# Patient Record
Sex: FEMALE | Race: BLACK OR AFRICAN AMERICAN | Employment: FULL TIME | URBAN - METROPOLITAN AREA
[De-identification: names, ages, dates, MRNs, and addresses within clinical notes are randomized per-mention and may not be internally consistent; named-entity substitution may affect disease eponyms.]

---
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
## 2020-05-05 ENCOUNTER — TRANSFERRED RECORDS (OUTPATIENT)
Dept: PHYSICAL THERAPY | Facility: CLINIC | Age: 20
End: 2020-05-05

## 2020-09-01 ENCOUNTER — THERAPY VISIT (OUTPATIENT)
Dept: PHYSICAL THERAPY | Facility: CLINIC | Age: 20
End: 2020-09-01
Payer: COMMERCIAL

## 2020-09-01 DIAGNOSIS — M54.2 NECK PAIN: ICD-10-CM

## 2020-09-01 PROCEDURE — 97161 PT EVAL LOW COMPLEX 20 MIN: CPT | Mod: GP | Performed by: PHYSICAL THERAPIST

## 2020-09-01 PROCEDURE — 97110 THERAPEUTIC EXERCISES: CPT | Mod: GP | Performed by: PHYSICAL THERAPIST

## 2020-09-01 NOTE — PROGRESS NOTES
[facility] Initial Evaluation  Subjective:  The history is provided by the patient.   C/C: Patient is a 20-year-old woman with complaints of relatively constant sore/dull neck pain.  Describes pain is being from the base of her skull to the base of her neck.  Intensity is 6-7/10.  Is aggravated by prolonged postures.  Is relieved with laying down on her back.  Pain will interrupt sleep at times.  Denies headache, dizziness, blurred vision, ringing in the ears.  No radiation of symptoms into her upper extremities.  No numbness or tingling.  Patient feels pain has stayed the same.  Hx:  12/2019-developed a cold with an associated tension headache.  Patient states that neck pain started during this time and has persisted.  Headache is relatively resolved.  Has tried over-the-counter medication with no real relief.  On occasion will try to self adjust her neck which does not improve pain.  PMH: No previous history of neck problems or injury.  General health: Pretty good (as per patient).  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                    Objective:  Standing Alignment:    Cervical/Thoracic:  Forward head                                    Cervical/Thoracic Evaluation    AROM:  AROM Cervical:    Flexion:            Chin to chest  Extension:       WNL  Rotation:         Left: WNL with use of lower cervical mechanics at endrange     Right: WNL with use of lower cervical mechanics at endrange  Side Bend:      Left: WNL-slight tendency to angle in mid c-sp     Right:  WNL-slight tendency to angle in mid c-sp      Headaches: none  Cervical Myotomes:  Cervical myotomes: Pt denies dropping thigs or difficulties with arms.                      Cervical Dermatomes:  Cervical dermatomes: Denies numbness or tingling.                                   Shoulder Evaluation:  ROM:  AROM:    Flexion:  Left:  WNL    Right:  WNL                                                                            General     ROS    Assessment/Plan:    Patient is a 20 year old female with cervical complaints.    Patient has the following significant findings with corresponding treatment plan.                Diagnosis 1:  Neck pain  Pain -  self management, education and home program  Decreased ROM/flexibility - manual therapy and therapeutic exercise  Decreased joint mobility - manual therapy and therapeutic exercise  Decreased strength - therapeutic exercise and therapeutic activities  Impaired muscle performance - neuro re-education  Decreased function - therapeutic activities  Impaired posture - neuro re-education    Therapy Evaluation Codes:   Cumulative Therapy Evaluation is: Low complexity.    Previous and current functional limitations:  (See Goal Flow Sheet for this information)    Short term and Long term goals: (See Goal Flow Sheet for this information)     Communication ability:  Patient appears to be able to clearly communicate and understand verbal and written communication and follow directions correctly.  Treatment Explanation - The following has been discussed with the patient:   RX ordered/plan of care  Anticipated outcomes  Possible risks and side effects  This patient would benefit from PT intervention to resume normal activities.   Rehab potential is excellent.    Frequency:  1 X week, once daily  Duration:  for 9 visits  Discharge Plan:  Achieve all LTG.  Independent in home treatment program.  Reach maximal therapeutic benefit.    Please refer to the daily flowsheet for treatment today, total treatment time and time spent performing 1:1 timed codes.

## 2020-09-01 NOTE — LETTER
[facility] [place] [facility]  [address]  [facility] [address]  [phone]    2020    Re: Cuauhtemoc Miramontes   :   2000  MRN:  [id number]   REFERRING PHYSICIAN:   Hayden Holden    [facility] [place]    Date of Initial Evaluation: 20  Visits:  Rxs Used: 1  Reason for Referral:  Neck pain    EVALUATION SUMMARY    [facility] Initial Evaluation  Subjective:  The history is provided by the patient.   C/C: Patient is a 20-year-old woman with complaints of relatively constant sore/dull neck pain.  Describes pain is being from the base of her skull to the base of her neck.  Intensity is 6-7/10.  Is aggravated by prolonged postures.  Is relieved with laying down on her back.  Pain will interrupt sleep at times.  Denies headache, dizziness, blurred vision, ringing in the ears.  No radiation of symptoms into her upper extremities.  No numbness or tingling.  Patient feels pain has stayed the same.  Hx:  2019-developed a cold with an associated tension headache.  Patient states that neck pain started during this time and has persisted.  Headache is relatively resolved.  Has tried over-the-counter medication with no real relief.  On occasion will try to self adjust her neck which does not improve pain.  PMH: No previous history of neck problems or injury.  General health: Pretty good (as per patient).  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                Objective:  Standing Alignment:    Cervical/Thoracic:  Forward head      Cervical/Thoracic Evaluation  AROM:  AROM Cervical:  Flexion:            Chin to chest  Extension:       WNL  Rotation:         Left: WNL with use of lower cervical mechanics at endrange     Right: WNL with use of lower cervical mechanics at endrange  Side Bend:      Left: WNL-slight tendency to angle in mid c-sp     Right:  WNL-slight tendency to angle in  mid c-sp    Headaches: none  Cervical Myotomes:  Cervical myotomes: Pt denies dropping thigs or difficulties with arms.      Re: Cuauhtemoc Miramontes   :   2000    Cervical Dermatomes:  Cervical dermatomes: Denies numbness or tingling.     Shoulder Evaluation:  ROM:  AROM:    Flexion:  Left:  WNL    Right:  WNL   General   ROS    Assessment/Plan:    Patient is a 20 year old female with cervical complaints.    Patient has the following significant findings with corresponding treatment plan.                Diagnosis 1:  Neck pain  Pain -  self management, education and home program  Decreased ROM/flexibility - manual therapy and therapeutic exercise  Decreased joint mobility - manual therapy and therapeutic exercise  Decreased strength - therapeutic exercise and therapeutic activities  Impaired muscle performance - neuro re-education  Decreased function - therapeutic activities  Impaired posture - neuro re-education    Therapy Evaluation Codes:   Cumulative Therapy Evaluation is: Low complexity.    Communication ability:  Patient appears to be able to clearly communicate and understand verbal and written communication and follow directions correctly.  Treatment Explanation - The following has been discussed with the patient:   RX ordered/plan of care  Anticipated outcomes  Possible risks and side effects  This patient would benefit from PT intervention to resume normal activities.   Rehab potential is excellent.  Frequency:  1 X week, once daily  Duration:  for 9 visits  Discharge Plan:  Achieve all LTG.  Independent in home treatment program.  Reach maximal therapeutic benefit.    Thank you for your referral.    INQUIRIES  Therapist: Fariha Clark, PT, ScD, [facility] [place] [facility]  [address] [place] [id number]  Phone: [phone]  Fax: [phone]

## 2020-09-24 ENCOUNTER — THERAPY VISIT (OUTPATIENT)
Dept: PHYSICAL THERAPY | Facility: CLINIC | Age: 20
End: 2020-09-24
Payer: COMMERCIAL

## 2020-09-24 DIAGNOSIS — M54.2 NECK PAIN: Primary | ICD-10-CM

## 2020-09-24 PROCEDURE — 97112 NEUROMUSCULAR REEDUCATION: CPT | Mod: 95 | Performed by: PHYSICAL THERAPIST

## 2020-09-24 PROCEDURE — 97110 THERAPEUTIC EXERCISES: CPT | Mod: 95 | Performed by: PHYSICAL THERAPIST

## 2020-10-15 ENCOUNTER — THERAPY VISIT (OUTPATIENT)
Dept: PHYSICAL THERAPY | Facility: CLINIC | Age: 20
End: 2020-10-15
Payer: COMMERCIAL

## 2020-10-15 DIAGNOSIS — M54.2 NECK PAIN: Primary | ICD-10-CM

## 2020-10-15 PROCEDURE — 97110 THERAPEUTIC EXERCISES: CPT | Mod: GP | Performed by: PHYSICAL THERAPIST

## 2020-10-15 PROCEDURE — 97112 NEUROMUSCULAR REEDUCATION: CPT | Mod: GP | Performed by: PHYSICAL THERAPIST

## 2020-10-15 NOTE — LETTER
[facility]  [address]  [facility] [address]  [phone]    2020    Re: Cuauhtemoc Miramontes   :   2000  MRN:  [id number]   REFERRING PHYSICIAN:   Hayden Holden    [facility]    Date of Initial Evaluation:  20  Visits:  Rxs Used: 3  Reason for Referral:  Neck pain    PROGRESS  REPORT  Progress reporting period is from 2020 to 10/15/2020.       SUBJECTIVE  Subjective changes noted by patient:  Subjective: Pt states that will still note intermittent pain in neck but intensity significantly less.  Feels that current ex program is managable and helpful.    Current pain level is      Previous pain level was   Initial Pain level: 7/10.   Changes in function:  Yes (See Goal flowsheet attached for changes in current functional level)  Adverse reaction to treatment or activity: None    OBJECTIVE  Changes noted in objective findings:    Objective: AROM of cervical ROM is WNL-still uses lower cervical mechanics at endrange rotation.  Full shoulder elevation B.  Pt doing 10 reps with strengthening ex.  Discussed the improtance of reaching some level of fatigue with strengthening ex.  Utilized effort scale.  Also added deep neck flexor strengtheninng. Pt feels comfortable with program and progress.  Will cont independently.  To reach out to therapist as needed.     ASSESSMENT/PLAN  Updated problem list and treatment plan: Diagnosis 1:  Neck pain  Pain -  self management, education and home program  Decreased strength - therapeutic exercise and therapeutic activities  Impaired muscle performance - neuro re-education  Decreased function - therapeutic activities  Impaired posture - neuro re-education  STG/LTGs have been met or progress has been made towards goals:  Yes (See Goal flow sheet completed today.)  Assessment of Progress: The patient's condition is improving.  Self Management Plans:  Patient has  been instructed in a home treatment program.  Patient  has been instructed in self management of symptoms.    Cuauhtemoc continues to require the following intervention to meet STG and LTG's:  PT  Re: Cuauhtemoc Miramontes   :   2000    Recommendations:  Pt will cont with HEP.  To reach out to therapist as needed.    Thank you for your referral.    INQUIRIES  Therapist: Fariha Clark, PT, ScD, [facility]   [facility] [place] [facility]  [address] [place] [address] [address] [place] [id number]  Phone: [phone]  Fax: [phone]

## 2020-10-15 NOTE — PROGRESS NOTES
Subjective:  HPI  Physical Exam                    Objective:  System    Physical Exam    General     ROS    Assessment/Plan:    PROGRESS  REPORT    Progress reporting period is from 9/1/2020 to 10/15/2020.       SUBJECTIVE  Subjective changes noted by patient:  .  Subjective: Pt states that will still note intermittent pain in neck but intensity significantly less.  Feels that current ex program is managable and helpful.    Current pain level is   .     Previous pain level was   Initial Pain level: 7/10.   Changes in function:  Yes (See Goal flowsheet attached for changes in current functional level)  Adverse reaction to treatment or activity: None    OBJECTIVE  Changes noted in objective findings:    Objective: AROM of cervical ROM is WNL-still uses lower cervical mechanics at endrange rotation.  Full shoulder elevation B.  Pt doing 10 reps with strengthening ex.  Discussed the improtance of reaching some level of fatigue with strengthening ex.  Utilized effort scale.  Also added deep neck flexor strengtheninng. Pt feels comfortable with program and progress.  Will cont independently.  To reach out to therapist as needed.     ASSESSMENT/PLAN  Updated problem list and treatment plan: Diagnosis 1:  Neck pain  Pain -  self management, education and home program  Decreased strength - therapeutic exercise and therapeutic activities  Impaired muscle performance - neuro re-education  Decreased function - therapeutic activities  Impaired posture - neuro re-education  STG/LTGs have been met or progress has been made towards goals:  Yes (See Goal flow sheet completed today.)  Assessment of Progress: The patient's condition is improving.  Self Management Plans:  Patient has been instructed in a home treatment program.  Patient  has been instructed in self management of symptoms.    Ala continues to require the following intervention to meet STG and LTG's:  PT    Recommendations:  Pt will cont with HEP.  To reach out to  therapist as needed.    Please refer to the daily flowsheet for treatment today, total treatment time and time spent performing 1:1 timed codes.